# Patient Record
Sex: FEMALE | Race: WHITE | NOT HISPANIC OR LATINO | Employment: OTHER | URBAN - METROPOLITAN AREA
[De-identification: names, ages, dates, MRNs, and addresses within clinical notes are randomized per-mention and may not be internally consistent; named-entity substitution may affect disease eponyms.]

---

## 2024-07-24 ENCOUNTER — APPOINTMENT (EMERGENCY)
Dept: RADIOLOGY | Facility: HOSPITAL | Age: 89
End: 2024-07-24
Payer: COMMERCIAL

## 2024-07-24 ENCOUNTER — APPOINTMENT (EMERGENCY)
Dept: CT IMAGING | Facility: HOSPITAL | Age: 89
End: 2024-07-24
Payer: COMMERCIAL

## 2024-07-24 ENCOUNTER — HOSPITAL ENCOUNTER (EMERGENCY)
Facility: HOSPITAL | Age: 89
Discharge: HOME | End: 2024-07-25
Attending: EMERGENCY MEDICINE | Admitting: EMERGENCY MEDICINE
Payer: COMMERCIAL

## 2024-07-24 DIAGNOSIS — S49.91XA RIGHT SHOULDER INJURY, INITIAL ENCOUNTER: Primary | ICD-10-CM

## 2024-07-24 DIAGNOSIS — S09.90XA HEAD INJURY: ICD-10-CM
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
## 2024-07-24 LAB
ANION GAP SERPL CALCULATED.3IONS-SCNC: 7 MMOL/L (ref 4–13)
BASOPHILS # BLD AUTO: 0.05 THOUSANDS/ÂΜL (ref 0–0.1)
BASOPHILS NFR BLD AUTO: 0 % (ref 0–1)
BUN SERPL-MCNC: 19 MG/DL (ref 5–25)
CALCIUM SERPL-MCNC: 9.8 MG/DL (ref 8.4–10.2)
CARDIAC TROPONIN I PNL SERPL HS: 5 NG/L
CHLORIDE SERPL-SCNC: 103 MMOL/L (ref 96–108)
CO2 SERPL-SCNC: 29 MMOL/L (ref 21–32)
CREAT SERPL-MCNC: 0.9 MG/DL (ref 0.6–1.3)
EOSINOPHIL # BLD AUTO: 0.09 THOUSAND/ÂΜL (ref 0–0.61)
EOSINOPHIL NFR BLD AUTO: 1 % (ref 0–6)
ERYTHROCYTE [DISTWIDTH] IN BLOOD BY AUTOMATED COUNT: 13.2 % (ref 11.6–15.1)
GFR SERPL CREATININE-BSD FRML MDRD: 56 ML/MIN/1.73SQ M
GLUCOSE SERPL-MCNC: 116 MG/DL (ref 65–140)
HCT VFR BLD AUTO: 41.5 % (ref 34.8–46.1)
HGB BLD-MCNC: 14.4 G/DL (ref 11.5–15.4)
IMM GRANULOCYTES # BLD AUTO: 0.04 THOUSAND/UL (ref 0–0.2)
IMM GRANULOCYTES NFR BLD AUTO: 0 % (ref 0–2)
LYMPHOCYTES # BLD AUTO: 0.59 THOUSANDS/ÂΜL (ref 0.6–4.47)
LYMPHOCYTES NFR BLD AUTO: 5 % (ref 14–44)
MCH RBC QN AUTO: 33.7 PG (ref 26.8–34.3)
MCHC RBC AUTO-ENTMCNC: 34.7 G/DL (ref 31.4–37.4)
MCV RBC AUTO: 97 FL (ref 82–98)
MONOCYTES # BLD AUTO: 0.83 THOUSAND/ÂΜL (ref 0.17–1.22)
MONOCYTES NFR BLD AUTO: 7 % (ref 4–12)
NEUTROPHILS # BLD AUTO: 10.44 THOUSANDS/ÂΜL (ref 1.85–7.62)
NEUTS SEG NFR BLD AUTO: 87 % (ref 43–75)
NRBC BLD AUTO-RTO: 0 /100 WBCS
PLATELET # BLD AUTO: 225 THOUSANDS/UL (ref 149–390)
PMV BLD AUTO: 9.5 FL (ref 8.9–12.7)
POTASSIUM SERPL-SCNC: 3.9 MMOL/L (ref 3.5–5.3)
RBC # BLD AUTO: 4.27 MILLION/UL (ref 3.81–5.12)
SODIUM SERPL-SCNC: 139 MMOL/L (ref 135–147)
WBC # BLD AUTO: 12.04 THOUSAND/UL (ref 4.31–10.16)

## 2024-07-24 PROCEDURE — 99284 EMERGENCY DEPT VISIT MOD MDM: CPT | Performed by: EMERGENCY MEDICINE

## 2024-07-24 PROCEDURE — 72125 CT NECK SPINE W/O DYE: CPT

## 2024-07-24 PROCEDURE — 84484 ASSAY OF TROPONIN QUANT: CPT | Performed by: EMERGENCY MEDICINE

## 2024-07-24 PROCEDURE — 36415 COLL VENOUS BLD VENIPUNCTURE: CPT | Performed by: EMERGENCY MEDICINE

## 2024-07-24 PROCEDURE — 80048 BASIC METABOLIC PNL TOTAL CA: CPT | Performed by: EMERGENCY MEDICINE

## 2024-07-24 PROCEDURE — 73030 X-RAY EXAM OF SHOULDER: CPT

## 2024-07-24 PROCEDURE — 71250 CT THORAX DX C-: CPT

## 2024-07-24 PROCEDURE — 70450 CT HEAD/BRAIN W/O DYE: CPT

## 2024-07-24 PROCEDURE — 93005 ELECTROCARDIOGRAM TRACING: CPT

## 2024-07-24 PROCEDURE — 99285 EMERGENCY DEPT VISIT HI MDM: CPT

## 2024-07-24 PROCEDURE — 85025 COMPLETE CBC W/AUTO DIFF WBC: CPT | Performed by: EMERGENCY MEDICINE

## 2024-07-25 VITALS
SYSTOLIC BLOOD PRESSURE: 180 MMHG | RESPIRATION RATE: 20 BRPM | TEMPERATURE: 98 F | OXYGEN SATURATION: 98 % | HEART RATE: 82 BPM | DIASTOLIC BLOOD PRESSURE: 72 MMHG | WEIGHT: 148 LBS

## 2024-07-25 LAB
2HR DELTA HS TROPONIN: 1 NG/L
ATRIAL RATE: 80 BPM
CARDIAC TROPONIN I PNL SERPL HS: 6 NG/L
P AXIS: 53 DEGREES
PR INTERVAL: 168 MS
QRS AXIS: -23 DEGREES
QRSD INTERVAL: 84 MS
QT INTERVAL: 358 MS
QTC INTERVAL: 412 MS
T WAVE AXIS: 65 DEGREES
VENTRICULAR RATE: 80 BPM

## 2024-07-25 PROCEDURE — 93010 ELECTROCARDIOGRAM REPORT: CPT | Performed by: INTERNAL MEDICINE

## 2024-07-25 PROCEDURE — 84484 ASSAY OF TROPONIN QUANT: CPT | Performed by: EMERGENCY MEDICINE

## 2024-07-25 PROCEDURE — 36415 COLL VENOUS BLD VENIPUNCTURE: CPT | Performed by: EMERGENCY MEDICINE

## 2024-07-25 RX ORDER — ACETAMINOPHEN 325 MG/1
650 TABLET ORAL ONCE
Status: COMPLETED | OUTPATIENT
Start: 2024-07-25 | End: 2024-07-25

## 2024-07-25 RX ORDER — LIDOCAINE 50 MG/G
1 PATCH TOPICAL ONCE
Status: DISCONTINUED | OUTPATIENT
Start: 2024-07-25 | End: 2024-07-25 | Stop reason: HOSPADM

## 2024-07-25 RX ADMIN — LIDOCAINE 5% 1 PATCH: 700 PATCH TOPICAL at 01:47

## 2024-07-25 RX ADMIN — ACETAMINOPHEN 650 MG: 325 TABLET, FILM COATED ORAL at 01:10

## 2024-07-25 NOTE — ED PROVIDER NOTES
History  Chief Complaint   Patient presents with    Fall     Fell down 2 steps at home at 2030, c/o R shoulder pain. + HS, -LOC,  - BT, ambulatory to triage, GCS 15 aox4     History of Present Illness   89 y.o. female presents to the ED after fall. Patient affirms striking her head and denies any loss of consciousness. Patient states the fall occurred about an hour ago when she missed a step and fell down one step.     Patient currently complains of right shoulder pain and headache.     ROS: Patient denies any abdominal pain, chest pain, other extremity pain, fever/chills, nausea/vomiting, visual changes, diarrhea, dyspnea, cough, arthralgia, dysuria. All other systems reviewed and are negative.    PHYSICAL EXAM:   Primary Exam   A: Patent   B: Bilateral equal breath sounds   C: Pulses intact in all extremities, no active bleeding   D: No signs of gross motor or cognitive neurologic impairment     Secondary Exam   Constitutional: No acute distress.   HENT: Normocephalic and atraumatic. Normal pharyngeal exam. No hemotympanum, raccoon eyes or Gonsalez sign.   Eyes: No hyphema. EOMI. PERRL.   Neck: No midline tenderness though there is some paraspinal tenderness on the right, supple.  CV: Regular rate and rhythm, no murmur. Peripheral pulses intact.   Respiratory: No traumatic findings. Lungs clear to auscultation bilaterally. Chest nontender.   Abdomen: No traumatic findings. Soft, non-tender, non-distended.   Back: No vertebral tenderness, step-offs or crepitus.  There is tenderness in the thoracic region along the right scapula.  Skin: Normal color, warm and dry   Extremities other than as noted: Non-tender, no deformities.   Right shoulder: Normal inspection.  Decreased range of motion secondary to pain.  Tenderness over the lateral aspect of the shoulder in the region of the greater trochanter.  There is also tenderness along the posterior aspect that continues along the scapula into the right lateral cervical  region.  2+ radial pulse with appropriate capillary refill.  Normal motor and sensory of the hand.  Neuro: Awake, alert, no gross sensory or motor deficits     Medical Decision Making   89-year-old female presenting status post fall.    Patient does not take any anticoagulation or antiplatelet agents; however, I cannot clear patient's head clinically secondary to the head strike and her age.  Will obtain noncontrast CT imaging of patient's head to evaluate for intracranial etiology.    While patient does not have any midline tenderness, her age and the mechanism along with her radiation of the pain into the right side of her neck is concerning for potential cervical etiology though fortunately patient's neurologic examination is intact.  Will obtain noncontrast CT imaging to evaluate for cervical injury.    Will obtain plain film imaging of patient's right shoulder and noncontrast CT imaging of patient's chest as she has shoulder pain and the radiation continues into the scapula and lateral neck.  Fortunately patient's neurologic examination is intact.    I offered patient analgesia and she is currently declining.  Will monitor patient and reassess.        None       No past medical history on file.    No past surgical history on file.    No family history on file.  I have reviewed and agree with the history as documented.    No existing history information found.  No existing history information found.       Review of Systems    Physical Exam  Physical Exam    Vital Signs  ED Triage Vitals [07/24/24 2220]   Temperature Pulse Respirations Blood Pressure SpO2   98 °F (36.7 °C) 75 18 (!) 180/72 95 %      Temp src Heart Rate Source Patient Position - Orthostatic VS BP Location FiO2 (%)   -- -- -- -- --      Pain Score       --           Vitals:    07/24/24 2220 07/25/24 0000 07/25/24 0030 07/25/24 0100   BP: (!) 180/72      Pulse: 75 83 81 82         Visual Acuity  Visual Acuity      Flowsheet Row Most Recent Value   L  Pupil Size (mm) 3   R Pupil Size (mm) 3            ED Medications  Medications   acetaminophen (TYLENOL) tablet 650 mg (650 mg Oral Given 7/25/24 0110)       Diagnostic Studies  Results Reviewed       Procedure Component Value Units Date/Time    HS Troponin I 2hr [[id number]]  (Normal) Collected: 07/25/24 0057    Lab Status: Final result Specimen: Blood from Arm, Left Updated: 07/25/24 0128     hs TnI 2hr 6 ng/L      Delta 2hr hsTnI 1 ng/L     HS Troponin 0hr (reflex protocol) [[id number]]  (Normal) Collected: 07/24/24 2311    Lab Status: Final result Specimen: Blood from Arm, Right Updated: 07/24/24 2345     hs TnI 0hr 5 ng/L     Basic metabolic panel [[id number]] Collected: 07/24/24 2311    Lab Status: Final result Specimen: Blood from Arm, Right Updated: 07/24/24 2341     Sodium 139 mmol/L      Potassium 3.9 mmol/L      Chloride 103 mmol/L      CO2 29 mmol/L      ANION GAP 7 mmol/L      BUN 19 mg/dL      Creatinine 0.90 mg/dL      Glucose 116 mg/dL      Calcium 9.8 mg/dL      eGFR 56 ml/min/1.73sq m     Narrative:      [employer] guidelines for Chronic Kidney Disease (CKD):     Stage 1 with normal or high GFR (GFR > 90 mL/min/1.73 square meters)    Stage 2 Mild CKD (GFR = 60-89 mL/min/1.73 square meters)    Stage 3A Moderate CKD (GFR = 45-59 mL/min/1.73 square meters)    Stage 3B Moderate CKD (GFR = 30-44 mL/min/1.73 square meters)    Stage 4 Severe CKD (GFR = 15-29 mL/min/1.73 square meters)    Stage 5 End Stage CKD (GFR <15 mL/min/1.73 square meters)  Note: GFR calculation is accurate only with a steady state creatinine    CBC and differential [[id number]]  (Abnormal) Collected: 07/24/24 2311    Lab Status: Final result Specimen: Blood from Arm, Right Updated: 07/24/24 2323     WBC 12.04 Thousand/uL      RBC 4.27 Million/uL      Hemoglobin 14.4 g/dL      Hematocrit 41.5 %      MCV 97 fL      MCH 33.7 pg      MCHC 34.7 g/dL      RDW 13.2 %      MPV 9.5 fL      Platelets 225 Thousands/uL       nRBC 0 /100 WBCs      Segmented % 87 %      Immature Grans % 0 %      Lymphocytes % 5 %      Monocytes % 7 %      Eosinophils Relative 1 %      Basophils Relative 0 %      Absolute Neutrophils 10.44 Thousands/µL      Absolute Immature Grans 0.04 Thousand/uL      Absolute Lymphocytes 0.59 Thousands/µL      Absolute Monocytes 0.83 Thousand/µL      Eosinophils Absolute 0.09 Thousand/µL      Basophils Absolute 0.05 Thousands/µL                    CT head without contrast   Final Result by Delano Ta MD (07/24 2332)      No acute intracranial abnormality.                  Workstation performed: [id number]         CT spine cervical without contrast   Final Result by Delano Ta MD (07/25 0006)      No acute cervical spine fracture or traumatic malalignment.                  Workstation performed: [id number]         CT chest without contrast   Final Result by Delano Ta MD (07/25 0011)      No acute chest trauma.               Workstation performed: [id number]         XR shoulder 2+ views RIGHT   ED Interpretation by Moi Cool MD (07/25 0129)   No acute fracture.                 Procedures  Procedures         ED Course  ED Course as of 07/25/24 0512   Thu Jul 25, 2024   0129 Patient's laboratory evaluation reassuring.  Imaging without acute findings though I have concerns for potential rotator cuff injury.  Will make patient nonweightbearing on that arm and placed in a sling.  Will provide information for follow-up with orthopedics.  Discussed this with the patient in detail.  Fortunately patient's neurovascular exam continues to be intact after placement of the sling.  I discussed and emphasized need for this follow-up and return to the emergency any progression or worsening symptoms.                                               Medical Decision Making  Amount and/or Complexity of Data Reviewed  Labs: ordered.  Radiology: ordered and independent interpretation performed.    Risk  OTC  drugs.                 Disposition  Final diagnoses:   Right shoulder injury, initial encounter   Fall   Head injury     Time reflects when diagnosis was documented in both MDM as applicable and the Disposition within this note       Time User Action Codes Description Comment    7/25/2024  1:38 AM Moi Cool [S49.91XA] Right shoulder injury, initial encounter     7/25/2024  1:39 AM Moi Cool [W19.XXXA] Fall     7/25/2024  1:39 AM Moi Cool [S09.90XA] Head injury           ED Disposition       ED Disposition   Discharge    Condition   Stable    Date/Time   Thu Jul 25, 2024  1:39 AM    Comment   Suad Radha discharge to home/self care.                   Follow-up Information       Follow up With Specialties Details Why Contact Info Additional Information    [facility] Schedule an appointment as soon as possible for a visit  Schedule follow-up with orthopedics or your orthopedics when you return to [place] for continued management of your shoulder injury. [address] [facility] [address]  [facility] [id number] [phone] [facility] [address], [place], [place], [address]   [phone]            There are no discharge medications for this patient.      No discharge procedures on file.    PDMP Review       None            ED Provider  Electronically Signed by             Moi Cool MD  07/25/24 0512